# Patient Record
Sex: MALE | Race: OTHER | NOT HISPANIC OR LATINO | URBAN - METROPOLITAN AREA
[De-identification: names, ages, dates, MRNs, and addresses within clinical notes are randomized per-mention and may not be internally consistent; named-entity substitution may affect disease eponyms.]

---
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
## 2023-06-22 NOTE — ED BEHAVIORAL HEALTH ASSESSMENT NOTE - OTHER PAST PSYCHIATRIC HISTORY (INCLUDE DETAILS REGARDING ONSET, COURSE OF ILLNESS, INPATIENT/OUTPATIENT TREATMENT)
Bed: 21  Expected date: 6/22/23  Expected time: 11:00 AM  Means of arrival: [facility]  Comments:  62yM abd pain   Developed depressive mood about two years ago.  Felt depressed once in a while "for a night, but I would feel better the next morning once I had friends around."  He denies other depressive symptom, cristobal or psychosis.    Denies any suicide attempt, SIB, inpatient or outpatient psychiatric treatment.